# Patient Record
Sex: MALE | Race: BLACK OR AFRICAN AMERICAN | NOT HISPANIC OR LATINO | URBAN - METROPOLITAN AREA
[De-identification: names, ages, dates, MRNs, and addresses within clinical notes are randomized per-mention and may not be internally consistent; named-entity substitution may affect disease eponyms.]

---

## 2024-01-30 ENCOUNTER — HOSPITAL ENCOUNTER (EMERGENCY)
Facility: HOSPITAL | Age: 64
Discharge: LEFT BEFORE DISCHARGE | End: 2024-01-30
Payer: COMMERCIAL

## 2024-01-30 VITALS
BODY MASS INDEX: 39.2 KG/M2 | SYSTOLIC BLOOD PRESSURE: 168 MMHG | OXYGEN SATURATION: 98 % | WEIGHT: 280 LBS | RESPIRATION RATE: 20 BRPM | DIASTOLIC BLOOD PRESSURE: 87 MMHG | HEIGHT: 71 IN | HEART RATE: 115 BPM

## 2024-01-30 PROCEDURE — 4500999001 HC ED NO CHARGE

## 2024-01-30 ASSESSMENT — COLUMBIA-SUICIDE SEVERITY RATING SCALE - C-SSRS
2. HAVE YOU ACTUALLY HAD ANY THOUGHTS OF KILLING YOURSELF?: NO
6. HAVE YOU EVER DONE ANYTHING, STARTED TO DO ANYTHING, OR PREPARED TO DO ANYTHING TO END YOUR LIFE?: NO
1. IN THE PAST MONTH, HAVE YOU WISHED YOU WERE DEAD OR WISHED YOU COULD GO TO SLEEP AND NOT WAKE UP?: NO

## 2024-01-30 ASSESSMENT — PAIN - FUNCTIONAL ASSESSMENT: PAIN_FUNCTIONAL_ASSESSMENT: 0-10

## 2024-01-30 ASSESSMENT — PAIN SCALES - GENERAL: PAINLEVEL_OUTOF10: 0 - NO PAIN

## 2024-05-30 ENCOUNTER — LAB (OUTPATIENT)
Dept: LAB | Facility: LAB | Age: 64
End: 2024-05-30
Payer: COMMERCIAL

## 2024-05-30 ENCOUNTER — HOSPITAL ENCOUNTER (OUTPATIENT)
Dept: RADIOLOGY | Facility: CLINIC | Age: 64
Discharge: HOME | End: 2024-05-30
Payer: COMMERCIAL

## 2024-05-30 ENCOUNTER — OFFICE VISIT (OUTPATIENT)
Dept: PRIMARY CARE | Facility: CLINIC | Age: 64
End: 2024-05-30
Payer: COMMERCIAL

## 2024-05-30 VITALS
HEIGHT: 71 IN | OXYGEN SATURATION: 97 % | RESPIRATION RATE: 16 BRPM | DIASTOLIC BLOOD PRESSURE: 75 MMHG | BODY MASS INDEX: 39.76 KG/M2 | WEIGHT: 284 LBS | HEART RATE: 97 BPM | SYSTOLIC BLOOD PRESSURE: 130 MMHG

## 2024-05-30 DIAGNOSIS — E66.01 CLASS 2 SEVERE OBESITY DUE TO EXCESS CALORIES WITH SERIOUS COMORBIDITY AND BODY MASS INDEX (BMI) OF 39.0 TO 39.9 IN ADULT (MULTI): ICD-10-CM

## 2024-05-30 DIAGNOSIS — M79.674 GREAT TOE PAIN, RIGHT: Primary | ICD-10-CM

## 2024-05-30 DIAGNOSIS — M79.674 GREAT TOE PAIN, RIGHT: ICD-10-CM

## 2024-05-30 DIAGNOSIS — I10 BENIGN ESSENTIAL HTN: Primary | ICD-10-CM

## 2024-05-30 PROBLEM — B35.1 ONYCHOMYCOSIS: Status: ACTIVE | Noted: 2022-08-22

## 2024-05-30 PROBLEM — E66.09 CLASS 2 OBESITY DUE TO EXCESS CALORIES WITHOUT SERIOUS COMORBIDITY IN ADULT: Status: ACTIVE | Noted: 2019-10-15

## 2024-05-30 PROBLEM — G47.33 OSA TREATED WITH BIPAP: Status: ACTIVE | Noted: 2024-05-30

## 2024-05-30 PROBLEM — C61 PROSTATE CANCER (MULTI): Status: ACTIVE | Noted: 2022-03-01

## 2024-05-30 PROBLEM — E66.812 CLASS 2 OBESITY DUE TO EXCESS CALORIES WITHOUT SERIOUS COMORBIDITY IN ADULT: Status: ACTIVE | Noted: 2019-10-15

## 2024-05-30 PROBLEM — N52.9 ED (ERECTILE DYSFUNCTION): Status: ACTIVE | Noted: 2018-11-08

## 2024-05-30 PROCEDURE — 3008F BODY MASS INDEX DOCD: CPT | Performed by: FAMILY MEDICINE

## 2024-05-30 PROCEDURE — 73660 X-RAY EXAM OF TOE(S): CPT | Mod: RIGHT SIDE | Performed by: RADIOLOGY

## 2024-05-30 PROCEDURE — 99204 OFFICE O/P NEW MOD 45 MIN: CPT | Performed by: FAMILY MEDICINE

## 2024-05-30 PROCEDURE — 84550 ASSAY OF BLOOD/URIC ACID: CPT

## 2024-05-30 PROCEDURE — 85652 RBC SED RATE AUTOMATED: CPT

## 2024-05-30 PROCEDURE — 3075F SYST BP GE 130 - 139MM HG: CPT | Performed by: FAMILY MEDICINE

## 2024-05-30 PROCEDURE — 36415 COLL VENOUS BLD VENIPUNCTURE: CPT

## 2024-05-30 PROCEDURE — 3078F DIAST BP <80 MM HG: CPT | Performed by: FAMILY MEDICINE

## 2024-05-30 PROCEDURE — 73660 X-RAY EXAM OF TOE(S): CPT | Mod: RT

## 2024-05-30 PROCEDURE — 86140 C-REACTIVE PROTEIN: CPT

## 2024-05-30 RX ORDER — LISINOPRIL AND HYDROCHLOROTHIAZIDE 20; 25 MG/1; MG/1
1 TABLET ORAL
COMMUNITY
Start: 2023-10-20 | End: 2025-03-19

## 2024-05-30 RX ORDER — ATORVASTATIN CALCIUM 20 MG/1
20 TABLET, FILM COATED ORAL DAILY
COMMUNITY

## 2024-05-30 ASSESSMENT — PATIENT HEALTH QUESTIONNAIRE - PHQ9
SUM OF ALL RESPONSES TO PHQ9 QUESTIONS 1 AND 2: 0
1. LITTLE INTEREST OR PLEASURE IN DOING THINGS: NOT AT ALL
2. FEELING DOWN, DEPRESSED OR HOPELESS: NOT AT ALL

## 2024-05-30 NOTE — PATIENT INSTRUCTIONS
Discussed bunion vs arthritis vs gout (due to current Hydrochlorothiazide use).  X-ray ordered.  Nonfasting labs ordered.  Will consider treatment (gout vs arthritis) vs podiatry referral (bunion) after reviewing results.    Referred to endocrine as discussed.    F/U as needed.    Lab services: [address]  Hours: M-F 6:30a-6p, Sat 8a-12p  Phone: [phone], Option 1

## 2024-05-30 NOTE — PROGRESS NOTES
"Subjective   Patient ID: Sy Burkett is a 63 y.o. male who presents for np to est care and Foot Pain.    HPI   Initial visit.  Here to establish care.  States he recently had annual wellness visit and med refills w/prior PCP.    H/O Bunion (right foot).  Reports right bunion pain x couple wks.  No trauma or increased activity prior to onset.  Described as sharp.  Intermittent.  Daily.  Worse w/wearing shoes, walking, minor trauma.  Improved w/not wearing shoes.  Rated at max 7-8/10.  Rated on average 2/10.  No h/o gout, but currently takes HCTZ for blood pressure.    H/O Obesity.  Wants to consider GLP1s for weight loss.    Review of Systems  No other complaints.     Objective   /75   Pulse 97   Resp 16   Ht 1.803 m (5' 11\")   Wt 129 kg (284 lb)   SpO2 97%   BMI 39.61 kg/m²     Physical Exam  Constitutional:       General: He is not in acute distress.     Appearance: He is obese.   Musculoskeletal:      Comments: Right foot: +Tenderness and swelling at 1st MTP, +Bunion   Neurological:      Mental Status: He is oriented to person, place, and time.   Psychiatric:         Mood and Affect: Mood normal.         Behavior: Behavior normal.     Assessment/Plan   Diagnoses and all orders for this visit:  Great toe pain, right  -     XR toe right 2+ views; Future  -     Uric Acid; Future  -     C-Reactive Protein; Future  -     Sedimentation Rate; Future  Class 2 severe obesity due to excess calories with serious comorbidity and body mass index (BMI) of 39.0 to 39.9 in adult (Multi)  -     Referral to Endocrinology; Future    Discussed bunion vs arthritis vs gout (due to current Hydrochlorothiazide use).  X-ray ordered.  Nonfasting labs ordered.  Will consider treatment (gout vs arthritis) vs podiatry referral (bunion) after reviewing results.    Referred to endocrine as discussed.    F/U as needed.  "

## 2024-05-31 LAB
CRP SERPL-MCNC: 1.02 MG/DL
ERYTHROCYTE [SEDIMENTATION RATE] IN BLOOD BY WESTERGREN METHOD: 32 MM/H (ref 0–20)
URATE SERPL-MCNC: 7.5 MG/DL (ref 4–7.5)

## 2024-06-02 DIAGNOSIS — M79.674 GREAT TOE PAIN, RIGHT: ICD-10-CM

## 2024-06-02 DIAGNOSIS — M21.619 BUNION: Primary | ICD-10-CM

## 2024-06-02 RX ORDER — METHYLPREDNISOLONE 4 MG/1
TABLET ORAL
Qty: 21 TABLET | Refills: 0 | Status: SHIPPED | OUTPATIENT
Start: 2024-06-02 | End: 2024-06-09

## 2024-08-08 ENCOUNTER — HOSPITAL ENCOUNTER (OUTPATIENT)
Dept: RADIOLOGY | Facility: CLINIC | Age: 64
Discharge: HOME | End: 2024-08-08
Payer: COMMERCIAL

## 2024-08-08 DIAGNOSIS — M25.562 LEFT KNEE PAIN, UNSPECIFIED CHRONICITY: ICD-10-CM

## 2024-08-08 PROCEDURE — 73562 X-RAY EXAM OF KNEE 3: CPT | Mod: LT

## 2024-08-08 PROCEDURE — 73562 X-RAY EXAM OF KNEE 3: CPT | Mod: LEFT SIDE | Performed by: RADIOLOGY

## 2024-08-12 ENCOUNTER — OFFICE VISIT (OUTPATIENT)
Dept: ORTHOPEDIC SURGERY | Facility: HOSPITAL | Age: 64
End: 2024-08-12
Payer: COMMERCIAL

## 2024-08-12 VITALS — WEIGHT: 280 LBS | BODY MASS INDEX: 37.93 KG/M2 | HEIGHT: 72 IN

## 2024-08-12 DIAGNOSIS — M17.12 PRIMARY OSTEOARTHRITIS OF LEFT KNEE: Primary | ICD-10-CM

## 2024-08-12 PROCEDURE — 3008F BODY MASS INDEX DOCD: CPT | Performed by: PHYSICIAN ASSISTANT

## 2024-08-12 PROCEDURE — 1125F AMNT PAIN NOTED PAIN PRSNT: CPT | Performed by: PHYSICIAN ASSISTANT

## 2024-08-12 PROCEDURE — 99204 OFFICE O/P NEW MOD 45 MIN: CPT | Performed by: PHYSICIAN ASSISTANT

## 2024-08-12 PROCEDURE — 2500000004 HC RX 250 GENERAL PHARMACY W/ HCPCS (ALT 636 FOR OP/ED): Performed by: PHYSICIAN ASSISTANT

## 2024-08-12 PROCEDURE — 1123F ACP DISCUSS/DSCN MKR DOCD: CPT | Performed by: PHYSICIAN ASSISTANT

## 2024-08-12 PROCEDURE — 1159F MED LIST DOCD IN RCRD: CPT | Performed by: PHYSICIAN ASSISTANT

## 2024-08-12 PROCEDURE — 1036F TOBACCO NON-USER: CPT | Performed by: PHYSICIAN ASSISTANT

## 2024-08-12 PROCEDURE — 20610 DRAIN/INJ JOINT/BURSA W/O US: CPT | Mod: LT | Performed by: PHYSICIAN ASSISTANT

## 2024-08-12 PROCEDURE — 99214 OFFICE O/P EST MOD 30 MIN: CPT | Mod: 25 | Performed by: PHYSICIAN ASSISTANT

## 2024-08-12 PROCEDURE — 2500000005 HC RX 250 GENERAL PHARMACY W/O HCPCS: Performed by: PHYSICIAN ASSISTANT

## 2024-08-12 RX ORDER — MELOXICAM 15 MG/1
TABLET ORAL
COMMUNITY
Start: 2024-08-08

## 2024-08-12 RX ORDER — LIDOCAINE HYDROCHLORIDE 10 MG/ML
0.5 INJECTION INFILTRATION; PERINEURAL
Status: COMPLETED | OUTPATIENT
Start: 2024-08-12 | End: 2024-08-12

## 2024-08-12 ASSESSMENT — PAIN SCALES - GENERAL: PAINLEVEL_OUTOF10: 9

## 2024-08-12 ASSESSMENT — PAIN - FUNCTIONAL ASSESSMENT: PAIN_FUNCTIONAL_ASSESSMENT: 0-10

## 2024-08-12 NOTE — PROGRESS NOTES
NPV-   History of Present Illness  64 y.o.male presents at same day walk in clinic for left knee pain  1. Primary osteoarthritis of left knee  Referral to Physical Therapy      Mechanism of injury: none  Date of Injury/Pain: 2 weeks  Location of pain: anteromedial knee  Frequency of Pain: worse with walking or bending  Associated symptoms? Swelling.  Previous treatment?   [facility], xrays, meloxicam - takes edge off   They deny any locking of the knee  HX of R TKA in 2013    27 point review of systems negative except what is stated in HPI     Constitutional Exam: patient's height and weight reviewed, well-kempt  Psychiatric Exam: alert and oriented x 3, appropriate mood and behavior  Eye Exam: WEI, EOMI  Pulmonary Exam: breathing non-labored, no apparent distress  Lymphatic exam: no appreciable lymphadenopathy in the lower extremities  Cardiovascular exam: DP pulses 2+ bilaterally, PT 2+ bilaterally, toes are pink with good capillary refill, no pitting edema  Skin exam: no open lesions, rashes, abrasions or ulcerations  Neurological exam: sensation to light touch intact in both lower extremities in peripheral and dermatomal distributions (except for any abnormalities noted in musculoskeletal exam)      On examination of the left knee:  Normal gait, neutral alignment  Minimal swelling; No effusion bruising or atrophy.  Neutral alignment.    Normal range of motion in extension and flexion.   Normal strength in flexion and extension.  No extensor lag.    Tenderness to palpation: medial joint line  No tenderness to palpation over the lateral joint line, tibial plateau, femoral condyles, quadriceps tendon, patellar tendon, patella, MCL or LCL.    Neurovascularly intact.  Normal sensation to light touch.  Popliteal, dorsalis pedis and posterior tibial pulses 2+ bilaterally.    Negative Shaun's test.   Negative Apley's test.   Negative anterior drawer test.   Negative posterior drawer test.    Negative Lachman's.   Negative  valgus stress test at 0 and 30° flexion.   Negative varus stress test at 0 and 30° flexion.   1-1 medial/lateral in 30 degrees flexion, 2-1 medial/lateral at  0 degrees with patellar glide test.   Positive patellar grind test.     Patient ID: Sy Burkett is a 64 y.o. male.    L Inj/Asp: L knee on 8/12/2024 9:19 AM  Indications: pain  Details: 22 G needle, anterolateral approach  Medications: 2.5 mg triamcinolone acetonide 10 mg/mL; 0.5 mL lidocaine 10 mg/mL (1 %)  Procedure, treatment alternatives, risks and benefits explained, specific risks discussed. Consent was given by the patient. Immediately prior to procedure a time out was called to verify the correct patient, procedure, equipment, support staff and site/side marked as required. Patient was prepped and draped in the usual sterile fashion.           I personally reviewed the patient's x-ray images and reports of the left knee. The xrays show no fractures or dislocation.  Moderate degenerative changes of the medial and patellofemoral joint.  Chondrocalcinosis       ASSESSMENT: left knee osteoarthritis    PLAN: Treatment options were discussed with the patient. Patient had his knee injected. The patient was given a prescription for physical therapy.  Physical therapy is medically necessary to improve strength, balance, range of motion and functional outcomes after injury and/or surgery. Patient should avoid deep flexion of the knee including kneeling, squatting or sitting in low chairs.  They should also avoid impact activities such as running and jumping but can use a stationary bike, pool exercises and upper body training. Patient was given a handout and instructed on an at home stretching program.  They should do these exercises 3 times per day for 6 weeks and then daily. Patient can use OTC aspercream for pain and continue to ice and elevate supported at the calf to reduce swelling. All the patient's questions were answered. The patient agrees with  the above plan.  Follow up as needed

## 2024-08-12 NOTE — PATIENT INSTRUCTIONS
Patient should avoid deep flexion of the knee including kneeling, squatting or sitting in low chairs.  They should also avoid impact activities such as running and jumping but can use a stationary bike, pool exercises and upper body training.    1. Follow stretching exercises that were on a separate handout   2. Hold each stretch for a least 1 minute  3. Do not bounce while stretching  4. Stretch for 10 minutes at a time, 3x a day for 6 weeks then daily  5. Remember, it takes several weeks to a few months of consistent stretching to increase flexibility and decrease symptoms.     You can use OTC Voltaren gel or aspercream and apply it to the injured area.    Ice and elevate supported at the calf with no pressure on the heel to reduce swelling.    The patient was given a prescription for physical therapy.  Physical therapy is medically necessary to improve strength, balance, range of motion and functional outcomes after injury and/or surgery.    Follow up as needed

## 2024-08-13 ENCOUNTER — APPOINTMENT (OUTPATIENT)
Dept: ORTHOPEDIC SURGERY | Facility: CLINIC | Age: 64
End: 2024-08-13
Payer: COMMERCIAL

## 2024-08-16 ENCOUNTER — OFFICE VISIT (OUTPATIENT)
Dept: ORTHOPEDIC SURGERY | Facility: CLINIC | Age: 64
End: 2024-08-16
Payer: COMMERCIAL

## 2024-08-16 ENCOUNTER — HOSPITAL ENCOUNTER (OUTPATIENT)
Dept: RADIOLOGY | Facility: CLINIC | Age: 64
Discharge: HOME | End: 2024-08-16
Payer: COMMERCIAL

## 2024-08-16 VITALS — WEIGHT: 280 LBS | HEIGHT: 72 IN | BODY MASS INDEX: 37.93 KG/M2

## 2024-08-16 DIAGNOSIS — S46.012A TRAUMATIC TEAR OF LEFT ROTATOR CUFF, UNSPECIFIED TEAR EXTENT, INITIAL ENCOUNTER: Primary | ICD-10-CM

## 2024-08-16 DIAGNOSIS — M25.512 LEFT SHOULDER PAIN, UNSPECIFIED CHRONICITY: ICD-10-CM

## 2024-08-16 PROCEDURE — 1036F TOBACCO NON-USER: CPT | Performed by: STUDENT IN AN ORGANIZED HEALTH CARE EDUCATION/TRAINING PROGRAM

## 2024-08-16 PROCEDURE — 3008F BODY MASS INDEX DOCD: CPT | Performed by: STUDENT IN AN ORGANIZED HEALTH CARE EDUCATION/TRAINING PROGRAM

## 2024-08-16 PROCEDURE — 1123F ACP DISCUSS/DSCN MKR DOCD: CPT | Performed by: STUDENT IN AN ORGANIZED HEALTH CARE EDUCATION/TRAINING PROGRAM

## 2024-08-16 PROCEDURE — 73030 X-RAY EXAM OF SHOULDER: CPT | Mod: LT

## 2024-08-16 PROCEDURE — 99214 OFFICE O/P EST MOD 30 MIN: CPT | Performed by: STUDENT IN AN ORGANIZED HEALTH CARE EDUCATION/TRAINING PROGRAM

## 2024-08-16 PROCEDURE — 1159F MED LIST DOCD IN RCRD: CPT | Performed by: STUDENT IN AN ORGANIZED HEALTH CARE EDUCATION/TRAINING PROGRAM

## 2024-08-16 ASSESSMENT — PAIN - FUNCTIONAL ASSESSMENT: PAIN_FUNCTIONAL_ASSESSMENT: NO/DENIES PAIN

## 2024-08-16 NOTE — PROGRESS NOTES
CHIEF COMPLAINT:   Chief Complaint   Patient presents with    Left Shoulder - Pain       History: 64 y.o. male presents to the office today for Left shoulder pain and popping. He reports of a fall 1 year ago and uses his left arm to break his fall and has had pain since. He is unable to move his shoulder past 90 degrees without being in pain. He has numbness/tingling in both his hands. No Hx of Sx or injection. No reported neck pain. Taken Advil and mobic with no help.  He is right-hand dominant.  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    Past medical history, past surgical history, medications, allergies, family history, social history, and review of systems were reviewed today.    A 12 point review of systems was negative other than as stated in the HPI.    No past medical history on file.     No Known Allergies     Past Surgical History:   Procedure Laterality Date    ADENOIDECTOMY      FOOT SURGERY Right     metatarsal fracture repair w/screw insertion and subsequent removal    TONSILLECTOMY  2022    TOTAL KNEE ARTHROPLASTY Right 2022        Family History   Problem Relation Name Age of Onset    Breast cancer Mother      Cancer Father          unknown primary    Gout Father          Social History     Socioeconomic History    Marital status:      Spouse name: Not on file    Number of children: Not on file    Years of education: Not on file    Highest education level: Not on file   Occupational History    Not on file   Tobacco Use    Smoking status: Former     Current packs/day: 0.00     Average packs/day: 0.5 packs/day for 8.0 years (4.0 ttl pk-yrs)     Types: Cigarettes     Start date:      Quit date:      Years since quittin.6    Smokeless tobacco: Never   Vaping Use    Vaping status: Never Used   Substance and Sexual Activity    Alcohol use: Yes    Drug use: Yes     Types: Marijuana    Sexual activity: Not on file   Other Topics Concern    Not on file   Social  History Narrative    Not on file     Social Determinants of Health     Financial Resource Strain: Patient Declined (3/19/2024)    Received from [facility]    Overall Financial Resource Strain (CARDIA)     Difficulty of Paying Living Expenses: Patient declined   Food Insecurity: Patient Declined (3/19/2024)    Received from [facility]    Hunger Vital Sign     Worried About Running Out of Food in the Last Year: Patient declined     Ran Out of Food in the Last Year: Patient declined   Transportation Needs: Patient Declined (3/19/2024)    Received from [facility]    PRAPARE - Transportation     Lack of Transportation (Medical): Patient declined     Lack of Transportation (Non-Medical): Patient declined   Physical Activity: Inactive (3/19/2024)    Received from [facility]    Exercise Vital Sign     Days of Exercise per Week: 0 days     Minutes of Exercise per Session: 0 min   Stress: No Stress Concern Present (3/19/2024)    Received from [facility] - Occupational Stress Questionnaire     Feeling of Stress : Not at all   Social Connections: Unknown (3/19/2024)    Received from [facility]    Social Connection and Isolation Panel [NHANES]     Frequency of Communication with Friends and Family: Patient declined     Frequency of Social Gatherings with Friends and Family: Patient declined     Attends Anabaptist Services: Patient declined     Active Member of Clubs or Organizations: Patient declined     Attends Club or Organization Meetings: Patient declined     Marital Status:    Intimate Partner Violence: Not on file   Housing Stability: Unknown (3/19/2024)    Received from [facility]    Housing Stability Vital Sign     Unable to Pay for Housing in the Last Year: No     Number of Places Lived in the Last Year:  "Not on file     In the last 12 months, was there a time when you did not have a steady place to sleep or slept in a shelter (including now)?: No        CURRENT MEDICATIONS:   Current Outpatient Medications   Medication Sig Dispense Refill    atorvastatin (Lipitor) 20 mg tablet Take 1 tablet (20 mg) by mouth once daily.      lisinopriL-hydrochlorothiazide 20-25 mg tablet Take 1 tablet by mouth once daily.      meloxicam (Mobic) 15 mg tablet        No current facility-administered medications for this visit.       Physical Examination:      9/19/2022    10:58 AM 9/20/2022     2:18 PM 10/3/2022     7:55 AM 11/9/2022    10:59 AM 1/30/2024     3:00 PM 5/30/2024     2:52 PM 8/12/2024     8:33 AM   Vitals   Systolic 132 123   168 130    Diastolic 84 81   87 75    Heart Rate 75 77   115 97    Resp     20 16    Height (in)  1.829 m (6') 1.828 m (5' 11.97\") 1.829 m (6') 1.803 m (5' 11\") 1.803 m (5' 11\") 1.829 m (6')   Weight (lb) 271 275.31 275.58 283 280 284 280   BMI  37.34 kg/m2 37.41 kg/m2 38.38 kg/m2 39.05 kg/m2 39.61 kg/m2 37.97 kg/m2   BSA (m2)  2.52 m2 2.52 m2 2.55 m2 2.52 m2 2.54 m2 2.54 m2   Visit Report      Report Report      There is no height or weight on file to calculate BMI.    Well-appearing, appears stated age, pleasant and cooperative, appropriate mood and behavior. Height and weight reviewed. Alert and oriented x3.  Auditory function intact.  No acute distress.  Intact ocular function, WEI, EOMI. Breathing is unlabored .  There is no evidence of jugular venous distension. Skin appearance is normal without evidence of rash or other lesions. 2+ radial pulses bilaterally, fingers pink and wwp, good capillary refill, no pitting edema. No appreciable lymphadenopathy in bilateral upper extremities. SILT throughout both upper extremities, median/radial/ulnar/musculocutaneous/axillary nerve motor and sensory intact (except for abnormalities noted in focused musculoskeletal exam section below).     Neck exam: " Full range of motion of the neck in flexion/extension and rotational movements. No significant areas of tenderness to palpation in the neck.    On exam of bilateral upper extremities, very limited range of motion and weakness with left shoulder examination.  On the right he is active forward flexion 160, external Tatian is 60, internal Tatian T12.  On the left he is active forward flexion to 90, external rotation to 30, internal Tatian to T12.  Passively, I can achieve full range of motion with his left shoulder.  He has severe weakness of rotator cuff strength testing on the left side, 3+ out of 5 supraspinatus strength, 4-5 external rotation strength with the mild external rotation lag sign.  Negative abdominal compression test.    Imaging: Radiographs of the left shoulder performed today.  Personally interpreted by myself.  Preserved glenohumeral joint space.  Preserved acromiohumeral interval.  No acute fractures noted.       Assessment: Traumatic left rotator cuff injury    Plan: I had a long discussion with the patient which about treatment options and diagnosis.  The patient had a traumatic injury to his left shoulder 1 year ago.  I do think that he sustained an acute, traumatic left rotator cuff tear at that time based on my examination today.  He has very limited ability to raise the arm and a severe weakness of rotator cuff strength testing.  I do think that it is important for us to get an MRI at this point to evaluate the integrity of the rotator cuff, as this will determine our treatment options.  Questions answered today.  Follow-up after MRI is complete.    Dragon software was used to dictate this note, please be aware that minor errors in transcription may be present.    Erasmo Mcallister MD    Shoulder/Elbow Surgery  [facility]

## 2024-08-29 ENCOUNTER — APPOINTMENT (OUTPATIENT)
Dept: ORTHOPEDIC SURGERY | Facility: HOSPITAL | Age: 64
End: 2024-08-29
Payer: COMMERCIAL

## 2024-08-30 ENCOUNTER — HOSPITAL ENCOUNTER (OUTPATIENT)
Dept: RADIOLOGY | Facility: HOSPITAL | Age: 64
Discharge: HOME | End: 2024-08-30
Payer: COMMERCIAL

## 2024-08-30 DIAGNOSIS — S46.012A TRAUMATIC TEAR OF LEFT ROTATOR CUFF, UNSPECIFIED TEAR EXTENT, INITIAL ENCOUNTER: ICD-10-CM

## 2024-08-30 DIAGNOSIS — M25.512 LEFT SHOULDER PAIN, UNSPECIFIED CHRONICITY: ICD-10-CM

## 2024-08-30 PROCEDURE — 73221 MRI JOINT UPR EXTREM W/O DYE: CPT | Mod: LT

## 2024-09-06 ENCOUNTER — APPOINTMENT (OUTPATIENT)
Dept: ORTHOPEDIC SURGERY | Facility: CLINIC | Age: 64
End: 2024-09-06
Payer: COMMERCIAL

## 2024-09-06 VITALS — BODY MASS INDEX: 37.93 KG/M2 | HEIGHT: 72 IN | WEIGHT: 280 LBS

## 2024-09-06 DIAGNOSIS — S46.012A TRAUMATIC TEAR OF LEFT ROTATOR CUFF, UNSPECIFIED TEAR EXTENT, INITIAL ENCOUNTER: Primary | ICD-10-CM

## 2024-09-06 PROCEDURE — 1036F TOBACCO NON-USER: CPT | Performed by: STUDENT IN AN ORGANIZED HEALTH CARE EDUCATION/TRAINING PROGRAM

## 2024-09-06 PROCEDURE — 1123F ACP DISCUSS/DSCN MKR DOCD: CPT | Performed by: STUDENT IN AN ORGANIZED HEALTH CARE EDUCATION/TRAINING PROGRAM

## 2024-09-06 PROCEDURE — 3008F BODY MASS INDEX DOCD: CPT | Performed by: STUDENT IN AN ORGANIZED HEALTH CARE EDUCATION/TRAINING PROGRAM

## 2024-09-06 PROCEDURE — 99213 OFFICE O/P EST LOW 20 MIN: CPT | Performed by: STUDENT IN AN ORGANIZED HEALTH CARE EDUCATION/TRAINING PROGRAM

## 2024-09-06 PROCEDURE — 1159F MED LIST DOCD IN RCRD: CPT | Performed by: STUDENT IN AN ORGANIZED HEALTH CARE EDUCATION/TRAINING PROGRAM

## 2024-09-06 ASSESSMENT — PAIN - FUNCTIONAL ASSESSMENT: PAIN_FUNCTIONAL_ASSESSMENT: NO/DENIES PAIN

## 2024-09-06 NOTE — PROGRESS NOTES
CHIEF COMPLAINT:   Chief Complaint   Patient presents with    Left Shoulder - Pain, Follow-up       History: 64 y.o. male presents to the office today for a follow up to review his left shoulder MRI results done 2024.  Overall his shoulder is feeling the same.    2024 HPI Visit Info:  Sy Burkett presents to the office today for Left shoulder pain and popping. He reports of a fall 1 year ago and uses his left arm to break his fall and has had pain since. He is unable to move his shoulder past 90 degrees without being in pain. He has numbness/tingling in both his hands. No Hx of Sx or injection. No reported neck pain. Taken Advil and mobic with no help.  He is right-hand dominant.  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    Past medical history, past surgical history, medications, allergies, family history, social history, and review of systems were reviewed today.    A 12 point review of systems was negative other than as stated in the HPI.    No past medical history on file.     No Known Allergies     Past Surgical History:   Procedure Laterality Date    ADENOIDECTOMY      FOOT SURGERY Right     metatarsal fracture repair w/screw insertion and subsequent removal    TONSILLECTOMY  2022    TOTAL KNEE ARTHROPLASTY Right 2022        Family History   Problem Relation Name Age of Onset    Breast cancer Mother      Cancer Father          unknown primary    Gout Father          Social History     Socioeconomic History    Marital status:      Spouse name: Not on file    Number of children: Not on file    Years of education: Not on file    Highest education level: Not on file   Occupational History    Not on file   Tobacco Use    Smoking status: Former     Current packs/day: 0.00     Average packs/day: 0.5 packs/day for 8.0 years (4.0 ttl pk-yrs)     Types: Cigarettes     Start date:      Quit date:      Years since quittin.7    Smokeless tobacco: Never   Vaping Use     Vaping status: Never Used   Substance and Sexual Activity    Alcohol use: Yes    Drug use: Yes     Types: Marijuana    Sexual activity: Not on file   Other Topics Concern    Not on file   Social History Narrative    Not on file     Social Determinants of Health     Financial Resource Strain: Patient Declined (3/19/2024)    Received from [facility]    Overall Financial Resource Strain (CARDIA)     Difficulty of Paying Living Expenses: Patient declined   Food Insecurity: Patient Declined (3/19/2024)    Received from [facility]    Hunger Vital Sign     Worried About Running Out of Food in the Last Year: Patient declined     Ran Out of Food in the Last Year: Patient declined   Transportation Needs: Patient Declined (3/19/2024)    Received from [facility]    PRAPARE - Transportation     Lack of Transportation (Medical): Patient declined     Lack of Transportation (Non-Medical): Patient declined   Physical Activity: Inactive (3/19/2024)    Received from [facility]    Exercise Vital Sign     Days of Exercise per Week: 0 days     Minutes of Exercise per Session: 0 min   Stress: No Stress Concern Present (3/19/2024)    Received from [facility]    [place] of Occupational Health - Occupational Stress Questionnaire     Feeling of Stress : Not at all   Social Connections: Unknown (3/19/2024)    Received from [facility]    Social Connection and Isolation Panel [NHANES]     Frequency of Communication with Friends and Family: Patient declined     Frequency of Social Gatherings with Friends and Family: Patient declined     Attends Latter day Services: Patient declined     Active Member of Clubs or Organizations: Patient declined     Attends Club or Organization Meetings: Patient declined     Marital Status:    Intimate Partner Violence: Not on file   Housing Stability:  "Unknown (3/19/2024)    [facility]    Housing Stability Vital Sign     Unable to Pay for Housing in the Last Year: No     Number of Places Lived in the Last Year: Not on file     Unstable Housing in the Last Year: No        CURRENT MEDICATIONS:   Current Outpatient Medications   Medication Sig Dispense Refill    atorvastatin (Lipitor) 20 mg tablet Take 1 tablet (20 mg) by mouth once daily.      lisinopriL-hydrochlorothiazide 20-25 mg tablet Take 1 tablet by mouth once daily.      meloxicam (Mobic) 15 mg tablet        No current facility-administered medications for this visit.       Physical Examination:      9/20/2022     2:18 PM 10/3/2022     7:55 AM 11/9/2022    10:59 AM 1/30/2024     3:00 PM 5/30/2024     2:52 PM 8/12/2024     8:33 AM 8/16/2024     8:55 AM   Vitals   Systolic 123   168 130     Diastolic 81   87 75     Heart Rate 77   115 97     Resp    20 16     Height (in) 1.829 m (6') 1.828 m (5' 11.97\") 1.829 m (6') 1.803 m (5' 11\") 1.803 m (5' 11\") 1.829 m (6') 1.829 m (6')   Weight (lb) 275.31 275.58 283 280 284 280 280   BMI 37.34 kg/m2 37.41 kg/m2 38.38 kg/m2 39.05 kg/m2 39.61 kg/m2 37.97 kg/m2 37.97 kg/m2   BSA (m2) 2.52 m2 2.52 m2 2.55 m2 2.52 m2 2.54 m2 2.54 m2 2.54 m2   Visit Report     Report Report Report      There is no height or weight on file to calculate BMI.    Well-appearing, appears stated age, pleasant and cooperative, appropriate mood and behavior. Height and weight reviewed. Alert and oriented x3.  Auditory function intact.  No acute distress.  Intact ocular function, WEI, EOMI. Breathing is unlabored .  There is no evidence of jugular venous distension. Skin appearance is normal without evidence of rash or other lesions. 2+ radial pulses bilaterally, fingers pink and wwp, good capillary refill, no pitting edema. No appreciable lymphadenopathy in bilateral upper extremities. SILT throughout both upper extremities, " median/radial/ulnar/musculocutaneous/axillary nerve motor and sensory intact (except for abnormalities noted in focused musculoskeletal exam section below).     Neck exam: Full range of motion of the neck in flexion/extension and rotational movements. No significant areas of tenderness to palpation in the neck.    On exam of bilateral upper extremities, very limited range of motion and weakness with left shoulder examination.  On the right he is active forward flexion 160, external Tatian is 60, internal Tatian T12.  On the left he is active forward flexion to 90, external rotation to 30, internal Tatian to T12.  Passively, I can achieve full range of motion with his left shoulder.  He has severe weakness of rotator cuff strength testing on the left side, 3+ out of 5 supraspinatus strength, 4-5 external rotation strength with the mild external rotation lag sign.  Negative abdominal compression test.    Imaging: MRI of the left shoulder was reviewed today.  Person interpreted by myself.  There is a massive rotator cuff tear involving the entirety of the supraspinatus and infraspinatus.  There is significant muscular atrophy of both rotator cuff muscles as well.    Assessment: Chronic, massive rotator cuff tear    Plan: I had a long discussion with the patient which about treatment options and diagnosis.  Imaging does reveal a chronic, massive rotator cuff tear with significant muscle atrophy.  This is likely related to the injury he had 1 year ago, although truthfully there likely was a component of this tear that was there prior to that.  We did discuss treatment options.  Did tell him that the only reliable way for him to regain his range of motion and strength would be to do a reverse shoulder replacement.  He is not interested in surgery at this time.  Therefore, we discussed physician directed exercises.  Did teach her multiple exercises that he can try on his own to try to see if he can strengthen his shoulder  and regain range of motion.  He declined an injection.  Will see if he can live with this and come back to the office as needed if he would like to discuss a replacement.     Total patient encounter took >20 minutes, including review of the medical record and all previous tests and notes related to this condition, any outside notes, recent or new imaging, as well as the patient interview, documentation in the EMR, placement of orders or any requested physician documentation and coordination of care.       Dragon software was used to dictate this note, please be aware that minor errors in transcription may be present.    Erasmo Mcallister MD    Shoulder/Elbow Surgery  [facility]

## 2024-09-10 ENCOUNTER — TELEMEDICINE CLINICAL SUPPORT (OUTPATIENT)
Dept: ENDOCRINOLOGY | Facility: CLINIC | Age: 64
End: 2024-09-10
Payer: COMMERCIAL

## 2024-09-10 ENCOUNTER — TELEPHONE (OUTPATIENT)
Dept: PRIMARY CARE | Facility: CLINIC | Age: 64
End: 2024-09-10

## 2024-09-10 ENCOUNTER — APPOINTMENT (OUTPATIENT)
Dept: ENDOCRINOLOGY | Facility: CLINIC | Age: 64
End: 2024-09-10
Payer: COMMERCIAL

## 2024-09-10 VITALS
BODY MASS INDEX: 39.55 KG/M2 | SYSTOLIC BLOOD PRESSURE: 112 MMHG | HEIGHT: 72 IN | DIASTOLIC BLOOD PRESSURE: 68 MMHG | WEIGHT: 292 LBS

## 2024-09-10 DIAGNOSIS — E66.01 CLASS 2 SEVERE OBESITY DUE TO EXCESS CALORIES WITH SERIOUS COMORBIDITY AND BODY MASS INDEX (BMI) OF 39.0 TO 39.9 IN ADULT (MULTI): ICD-10-CM

## 2024-09-10 DIAGNOSIS — I10 BENIGN ESSENTIAL HTN: ICD-10-CM

## 2024-09-10 DIAGNOSIS — E78.00 PURE HYPERCHOLESTEROLEMIA: ICD-10-CM

## 2024-09-10 DIAGNOSIS — E88.810 DYSMETABOLIC SYNDROME: ICD-10-CM

## 2024-09-10 DIAGNOSIS — R73.01 IMPAIRED FASTING GLUCOSE: Primary | ICD-10-CM

## 2024-09-10 PROCEDURE — 3078F DIAST BP <80 MM HG: CPT | Performed by: INTERNAL MEDICINE

## 2024-09-10 PROCEDURE — 99204 OFFICE O/P NEW MOD 45 MIN: CPT | Performed by: INTERNAL MEDICINE

## 2024-09-10 PROCEDURE — 3074F SYST BP LT 130 MM HG: CPT | Performed by: INTERNAL MEDICINE

## 2024-09-10 PROCEDURE — 1123F ACP DISCUSS/DSCN MKR DOCD: CPT | Performed by: INTERNAL MEDICINE

## 2024-09-10 PROCEDURE — 1159F MED LIST DOCD IN RCRD: CPT | Performed by: INTERNAL MEDICINE

## 2024-09-10 PROCEDURE — 3008F BODY MASS INDEX DOCD: CPT | Performed by: INTERNAL MEDICINE

## 2024-09-10 RX ORDER — POTASSIUM CHLORIDE 750 MG/1
10 TABLET, FILM COATED, EXTENDED RELEASE ORAL 2 TIMES DAILY
Qty: 60 TABLET | Refills: 5 | Status: SHIPPED | OUTPATIENT
Start: 2024-09-10 | End: 2025-09-10

## 2024-09-10 NOTE — PROGRESS NOTES
Patient ID: Sy Burkett is a 64 y.o. male who presents for New Patient Visit (Endocrine consult. Referred by  for weight loss.).  HPI  The patient was referred for evaluation for PSMF.    This is a 64-year-old gentleman who has had a problem with weight for the past 20 years.    He has been on numerous approaches most success with low-carb but was unable to keep the weight off.    Weight is complicated by insulin resistance with impaired fasting glucose DJD and he needs a left knee replacement hypertension hyperlipidemia edema sleep apnea on CPAP.    He avoid skipping meals does some stress eating.    He is not currently exercising.    He has a past history of bladder stones prostate CA SVT.  He has never had gout kidney stones or gallstones.    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    Family history negative for diabetes thyroid in the sister.  ROS  Comprehensive review of systems is negative.    Objective   Physical Exam  Visit Vitals  /68      Vitals:    09/10/24 0844   Weight: 132 kg (292 lb)      Body mass index is 39.6 kg/m².      Alert and oriented x3  In no distress  No focal neurologic deficits  No supraclavicular, or dorsal fat  No purple striae  Integument intact  Eyes normal  ENT normal. No adenopathy  Thyroid palpable and normal. No nodules  Chest clear to auscultation  Heart sounds are normal  Abdomen nontender. Bowel sounds normal. No organomegaly  Feet are okay  Reflexes normal with normal return    Current Outpatient Medications   Medication Sig Dispense Refill    atorvastatin (Lipitor) 20 mg tablet Take 1 tablet (20 mg) by mouth once daily.      lisinopriL-hydrochlorothiazide 20-25 mg tablet Take 1 tablet by mouth once daily.      meloxicam (Mobic) 15 mg tablet        No current facility-administered medications for this visit.       Assessment/Plan     1.  Insulin resistance  2.   Impaired fasting glucose  3.  Hypertension  4.  Hyperlipidemia  5.  DJD  6.  Sleep apnea    We discussed insulin resistance this pathophysiology and its impact.    We discussed risks, benefits and alternatives to the protein sparing modified fast.    We discussed risks, including, but not limited to the potential for electrolyte imbalance which could lead to cardiac arrhythmia.  The high protein nature of the diet increasing levels of uric acid which could lead to nephrolithiasis or gout.  The low fiber nature of the diet increasing risk for constipation.  Risk for gallstones, cold intolerance, excess skin seen with significant weight loss.      We discussed the benefits of weight loss regarding co-morbid conditions.      We also discussed alternatives to the program, including a balanced calorie restricted approach coupled with exercise and he would like to proceed.  We'll therefore set him up with the nutritionist for dietary instruction. When he starts the program, we'll add in potassium, as well as the other supplements except for the potassium and keep a close eye on electrolytes.    He'll follow up with me in 2 months, sooner as needed.

## 2024-09-10 NOTE — PROGRESS NOTES
"Patient Discussion/Summary:   Met with pt on 09/10/24 [facility]. Expressed desired wt between 220-230lb. Established first goal to reach ~270lb. No regular exercise at this time due to left knee pain that may need surgery. Does well consuming unsweetened drink choice; encouraged increasing water intake.     Protein sparing modified fasting education handout was provided to patient and reviewed at this time along with daily protein and fluid recommendations. Understands need to take supplements, measure foods and test ketones going forward. No concerns or challenge areas presented by pt at this time. Encouraged to follow diet as outlined for optimal results. Discussed meal examples and provided encouragement. Pt engaged and asked multiple questions during session.         No results found for: \"HGBA1C\"   Potassium   Date Value Ref Range Status   10/03/2022 3.8 3.5 - 5.3 mmol/L Final     Magnesium   Date Value Ref Range Status   2022 1.70 1.60 - 2.40 mg/dL Final      Glucose   Date Value Ref Range Status   10/03/2022 90 74 - 99 mg/dL Final     Wt Readings from Last 10 Encounters:   09/10/24 132 kg (292 lb)   24 127 kg (280 lb)   24 127 kg (280 lb)   24 127 kg (280 lb)   24 129 kg (284 lb)   22 128 kg (283 lb)   10/03/22 125 kg (275 lb 9.2 oz)   22 125 kg (275 lb 5 oz)   22 123 kg (271 lb)     Calculations:    IBW: Ideal body weight: 77.6 kg (171 lb 1.2 oz)  Adjusted ideal body weight: 99.5 kg (219 lb 7.1 oz)   Protein need calculated (1.5g/kg IBW) =121xg  Protein intake recommended: 126g = 18oz      Daily Meal Plan:   Morninoz protein          Afternoon:  6.5oz protein + 1 vegetable from the list   Evenin.5oz protein + 1 vegetable from the list   Fluids:        64-96oz per day     Recommended supplements:  Potassium as directed with breakfast and dinner (prescription)  Fiber capsules 1-2 times daily or sugar-free fiber powder (as " tolerated)   Colace (stool softener) as needed, up to 4 capsules/day  Calcium: 500-600 mg, pill form twice daily with breakfast or lunch AND dinner  Multivitamin tablet or capsule per day that is low in iron (i.e. Centrum Silver)  Magnesium: 200-250 mg, pill form with dinner or at bedtime  Bouillon cube one to two times per day as needed, if feeling dizzy   Beginning with the fourth morning of this diet, test urine every morning using Ketostix for ketone (fat) breakdown.

## 2024-09-11 ENCOUNTER — APPOINTMENT (OUTPATIENT)
Dept: ORTHOPEDIC SURGERY | Facility: HOSPITAL | Age: 64
End: 2024-09-11
Payer: COMMERCIAL

## 2024-09-12 DIAGNOSIS — M25.562 LEFT KNEE PAIN, UNSPECIFIED CHRONICITY: Primary | ICD-10-CM

## 2024-09-13 ENCOUNTER — OFFICE VISIT (OUTPATIENT)
Dept: ORTHOPEDIC SURGERY | Facility: HOSPITAL | Age: 64
End: 2024-09-13
Payer: COMMERCIAL

## 2024-09-13 ENCOUNTER — HOSPITAL ENCOUNTER (OUTPATIENT)
Dept: RADIOLOGY | Facility: HOSPITAL | Age: 64
Discharge: HOME | End: 2024-09-13
Payer: COMMERCIAL

## 2024-09-13 DIAGNOSIS — M21.162 VARUS DEFORMITY, NOT ELSEWHERE CLASSIFIED, LEFT KNEE: ICD-10-CM

## 2024-09-13 DIAGNOSIS — M25.362 INSTABILITY OF LEFT KNEE JOINT: ICD-10-CM

## 2024-09-13 DIAGNOSIS — M25.562 LEFT KNEE PAIN, UNSPECIFIED CHRONICITY: ICD-10-CM

## 2024-09-13 DIAGNOSIS — M17.12 PRIMARY OSTEOARTHRITIS OF LEFT KNEE: Primary | ICD-10-CM

## 2024-09-13 PROCEDURE — 99213 OFFICE O/P EST LOW 20 MIN: CPT | Performed by: SPECIALIST/TECHNOLOGIST

## 2024-09-13 PROCEDURE — L1812 KO ELASTIC W/JOINTS PRE OTS: HCPCS | Performed by: STUDENT IN AN ORGANIZED HEALTH CARE EDUCATION/TRAINING PROGRAM

## 2024-09-13 PROCEDURE — 1036F TOBACCO NON-USER: CPT | Performed by: SPECIALIST/TECHNOLOGIST

## 2024-09-13 PROCEDURE — 1159F MED LIST DOCD IN RCRD: CPT | Performed by: SPECIALIST/TECHNOLOGIST

## 2024-09-13 PROCEDURE — 1160F RVW MEDS BY RX/DR IN RCRD: CPT | Performed by: SPECIALIST/TECHNOLOGIST

## 2024-09-13 PROCEDURE — 1123F ACP DISCUSS/DSCN MKR DOCD: CPT | Performed by: SPECIALIST/TECHNOLOGIST

## 2024-09-13 NOTE — PROGRESS NOTES
Chief Complaint: Pain of the Left Knee       HPI:  Sy Burkett is a 64 y.o. male presenting today for new patient evaluation for his left knee.  He was seen previously by my colleague Avila Tolbert PA-C on 8/12/2024 and was provided with an intra-articular corticosteroid injection.  He says that this provided him no relief of his symptoms.  He continues to report a generalized sharp sensation with walking and an achy throbby pain throughout the entire knee worsened at night as well.  He has been using Tylenol and ibuprofen as needed.  Since his appointment with Avila, he has been seen by a provider at the [facility] for an additional opinion on his left knee.  He states that they had begun the process to obtain viscosupplementation injections but was unable to get approval until 3 months from now.  He denies numbness or tingling in the left lower extremity.  He has a prior right knee replacement at the [facility] by a provider who is no longer there.  He states that his left knee feels very similar to his right prior to his surgery.  He states his pain is 7-8 out of 10.  He presents for treatment recommendations.    Objective     ROS:  Constitutional: No fever, no chills, not feeling tired, no recent weight gain and no recent weight loss  ENT: No nosebleeds  Cardiovascular: No chest pain  Respiratory: No shortness of breath and no cough  Gastrointestinal: No abdominal pain, no nausea, no diarrhea, and no vomiting  Musculoskeletal: Positive for left knee pain  Integumentary: No rashes and no skin lesions  Neurological: No headache  Psychiatric: No sleep disturbances no depression  Endocrine: No muscle weakness and no muscle cramps  Hematologic/lymphatic: No swelling glands and no tendency for easy bruising    No past medical history on file.     Past Surgical History:   Procedure Laterality Date    ADENOIDECTOMY      FOOT SURGERY Right     metatarsal fracture repair w/screw insertion and subsequent  removal    TONSILLECTOMY  09/19/2022    TOTAL KNEE ARTHROPLASTY Right 09/19/2022        Social Connections: Unknown (3/19/2024)    [facility]    Social Connection and Isolation Panel [NHANES]     Frequency of Communication with Friends and Family: Patient declined     Frequency of Social Gatherings with Friends and Family: Patient declined     Attends Jain Services: Patient declined     Active Member of Clubs or Organizations: Patient declined     Attends Club or Organization Meetings: Patient declined     Marital Status:           Physical Exam:  General appearance: WN, WD male, in no acute distress  Skin: No rashes, lesions or wounds  Head: Normocephalic, no evidence of trauma  Eye: EOMI, conjunctiva clear, no discharge  ENT: Nares patent  Neck: No abnormal contour, tracheal midline  Chest/lungs: No respiratory distress, speaking in complete sentences  Musculoskeletal: Tender to palpation over the medial joint line.  Stable valgus and varus stress test at 0 and 30 degrees.  He is able to perform an isometric quadriceps contraction and straight leg raise without difficulty or lag.  Knee flexion to approximately 100 degrees.  Patellofemoral crepitus noted on his left side.  Trace effusion.  No decreased ROM, muscle wasting, rigidity    Neurological: A&O x3, no focal deficits, intact bilateral LE  Psych: normal affect, mood, appearance      Image Results:  X-rays dated 8/8/2024 reviewed with the patient in the office today and show no acute fractures or dislocations.  There is moderate to severe patellofemoral osteoarthritic changes, moderate medial compartment and mild to moderate lateral compartment osteoarthritic changes.      Assessment/Plan   Encounter Diagnoses:  Varus deformity, not elsewhere classified, left knee    Instability of left knee joint    Orders Placed This Encounter    KO Medial OA        The patient and I discussed his clinical  presentation and physical exam findings consistent with left knee osteoarthritis.  We discussed his conservative and surgical treatment options.  Unfortunately, the only surgical intervention that would provide him any relief of his symptoms would be a total knee replacement.  He states that he would like to speak with one of the total joint replacement colleagues at their earliest availability.  We have arranged for him to see Dr. Brantley on 9/20/2024.  I did state to the patient that since he had a recent corticosteroid injection into the left knee that this would most likely result in a delayed joint replacement secondary to the risk of increased infection.  Today, we agreed upon continued conservative management.  We agreed upon a medial  brace to be worn at all times while ambulatory.  He is in agreement the plan.  His questions have been answered.    Patient was prescribed a medial  brace for left knee osteoarthritis and varus deformity.The patient is ambulatory with or without aid; but, has weakness, instability and/or deformity of their left knee which requires stabilization from this orthosis to improve their function.      Verbal and written instructions for the use, wear schedule, cleaning and application of this item were given.  Patient was instructed that should the brace result in increased pain, decreased sensation, increased swelling, or an overall worsening of their medical condition, to please contact our office immediately.     Orthotic management and training was provided for skin care, modifications due to healing tissues, edema changes, interruption in skin integrity, and safety precautions with the orthosis.      ** This office note was dictated using Dragon voice to text software and was not proofread for spelling or grammatical errors **

## 2024-09-20 ENCOUNTER — OFFICE VISIT (OUTPATIENT)
Dept: ORTHOPEDIC SURGERY | Facility: CLINIC | Age: 64
End: 2024-09-20
Payer: COMMERCIAL

## 2024-09-20 DIAGNOSIS — M17.12 PRIMARY OSTEOARTHRITIS OF LEFT KNEE: Primary | ICD-10-CM

## 2024-09-20 PROCEDURE — 99214 OFFICE O/P EST MOD 30 MIN: CPT | Performed by: STUDENT IN AN ORGANIZED HEALTH CARE EDUCATION/TRAINING PROGRAM

## 2024-09-20 ASSESSMENT — ENCOUNTER SYMPTOMS
DEPRESSION: 0
OCCASIONAL FEELINGS OF UNSTEADINESS: 0
LOSS OF SENSATION IN FEET: 0

## 2024-09-20 NOTE — PROGRESS NOTES
Sabrina Brantley MD   Adult Reconstruction and Joint Replacement Surgery  Phone: [phone]     Fax: [phone]       Name: Sy Burkett  Age: 64 y.o.   : 1960   Date of Visit: 2024    INITIAL CONSULTATION    CC: Left knee pain    Clinical History:  This patient presents with 1 months of LEFT knee pain. They were referred by Lupillo Sanford PA-C.    Patient has tried the following Ice, NSAIDs, Activity modification, Brace , Corticosteroid injections , and Xray. Date of last steroid injection: 24. Reports it did not help at all. Patient does have pain at night. Patient does not report falls related to this problem. Patient is able to walk indoors only. Patient is currently using nothing as assistive device. Primarily complains of medial pain. Patient has difficulty with climbing stairs, descending stairs, walking , and walking on unlevel surfaces . The pain is significantly impacting their ability to perform activities of daily living. Patient reports no longer able to do activities such as kneeling.     Focused History  PMH: Reviewed and PE/DVT: no  PSH: Reviewed , Hip/Knee replacement: RTKA 2013 [facility], doing well, Hip/Knee surgery: As above, Anesthesia complications: no, Spine surgery: no, Surgical infection: no, and Weight loss surgery: no  Meds: Reviewed, Current Anticoagulants: no, Weight loss medication: no, and Current Opioids: no  Allergies: Reviewed  and The patient reports no contraindications or allergies to cephalosporins, aspirin, NSAIDs or opioids, except as noted above.  FH: No family history of any bleeding or clotting disorders.  SHx: Reviewed, Occupation: , Current smoker: no, EtOH intake weekly: 3 times, Social support: unk, and Preferred physical activities:   Dental Hx: unk  Jehovah´s Witness: no    PROMs/HISTORY  PROMs   No questionnaires on file.     No past medical history on file.    No past medical history on file.  Documented in chart and reviewed.     Past  Surgical History:   Procedure Laterality Date    ADENOIDECTOMY      FOOT SURGERY Right     metatarsal fracture repair w/screw insertion and subsequent removal    TONSILLECTOMY  2022    TOTAL KNEE ARTHROPLASTY Right 2022       Allergies: He has No Known Allergies.     Medications:  Current Outpatient Medications   Medication Instructions    atorvastatin (LIPITOR) 20 mg, oral, Daily    lisinopriL-hydrochlorothiazide 20-25 mg tablet 1 tablet, oral, Daily RT    meloxicam (Mobic) 15 mg tablet     potassium chloride CR 10 mEq ER tablet 10 mEq, oral, 2 times daily, Do not crush, chew, or split.       Family History   Problem Relation Name Age of Onset    Breast cancer Mother      Cancer Father          unknown primary    Gout Father       Documented in chart and reviewed.     Social History     Tobacco Use    Smoking status: Former     Current packs/day: 0.00     Average packs/day: 0.5 packs/day for 8.0 years (4.0 ttl pk-yrs)     Types: Cigarettes     Start date:      Quit date:      Years since quittin.7    Smokeless tobacco: Never   Substance Use Topics    Alcohol use: Yes        Review of Systems: Review of systems completed with medical assistant intake. Please refer to this note.     Physical Exam:  BMI: 39.6.    General: The patient is well appearing and has an appropriate affect.     Neurological Examination: SILT in SPN/DPN/Sural/Saphenous/Tibial nerves. 5/5 EHL, FHL, Tibial anterior, Gastrocnemius. Coordination grossly intact.     Cardiovascular Exam: Capillary refill <2 seconds. No edema. No varicose veins.     Lymphatic Examination: There is no obvious lymphatic swelling present around the involved joint.    Skin Exam: Skin around the pertinent joint is without evidence of infection or rash.    Gait: The patient ambulates with an antalgic gait.     Right Hip Examination:  The skin is intact over the hip.    There is no tenderness over the greater trochanter.    Range of motion is full  extension to 100 degrees of flexion.    The hip is stable without subluxation or dislocation.    The hip internally rotates to 15 degrees and externally rotates to 45 degrees.    There is no pain with hip motion.    Left Hip Examination:  The skin is intact over the hip.    There is no tenderness over the greater trochanter.    Range of motion is full extension to 100 degrees of flexion.    The hip is stable without subluxation or dislocation.    The hip internally rotates to 15 degrees and externally rotates to 45 degrees.    There is no pain with hip motion.    Left Knee Examination:  Examination of the left knee reveals the skin to be intact.    There is a moderate effusion in the knee.    The alignment of the knee is Varus.    This deformity is partially correctable.    There is tenderness to palpation over the joint line.    There is significant quadriceps atrophy.    Range of Motion: 5 to 115 degrees of flexion.    The knee is stable to varus-valgus stress and anterior-posterior stress.     There is moderate grinding with range of motion.    There is mild patellofemoral crepitus.    Right Knee Examination:  Examination of the right knee reveals the skin to be intact.     There is no obvious swelling.    There is a no effusion in the knee.     The alignment of the knee is normal.    There is no tenderness to palpation over the joint line.    There is no significant quadriceps atrophy.    Range of motion is full extension to 120 degrees of flexion.    The knee is stable to varus-valgus stress and anterior-posterior stress.     There is no grinding with range of motion.    There is no patellofemoral crepitus.    Prior Labs:   Prior Labs:   Lab Results   Component Value Date    WBC 6.8 09/14/2022    HGB 11.9 (L) 09/14/2022    HCT 34.8 (L) 09/14/2022    MCV 91 09/14/2022     09/14/2022      Lab Results   Component Value Date    INR 1.1 02/01/2019    PROTIME 12.2 02/01/2019         Lab Results   Component  "Value Date    GLUCOSE 90 10/03/2022    CALCIUM 9.2 10/03/2022     10/03/2022    K 3.8 10/03/2022    CO2 27 10/03/2022     10/03/2022    BUN 25 (H) 10/03/2022    CREATININE 0.97 10/03/2022      No results found for: \"CKTOTAL\", \"CKMB\", \"CKMBINDEX\", \"TROPONINI\"   No results found for: \"HGBA1C\"        Lab Results   Component Value Date    CRP 1.02 (H) 05/30/2024      Lab Results   Component Value Date    SEDRATE 32 (H) 05/30/2024         Radiographs:  Radiographs were personally reviewed today. There is evidence of moderate LEFT knee osteoarthritis without bone on bone apposition.    Impression:  This patient presents with moderate LEFT knee osteoarthritis without bone on bone apposition.  There is no indication for surgery at this time.    Diagnosis:  Primary osteoarthritis of left knee     Recommendations / Plan:    I have discussed the options in detail with the patient. We have discussed anti-inflammatory medication, activity modification, physical therapy, corticosteroid injections, viscosupplementation injections, partial knee replacement surgery and total knee replacement surgery. The patient has not yet exhausted all conservative treatment measures.    The risks and benefits of all these treatment options have been discussed in detail.     Currently their BMI is 39.6.  Discussed that obesity is a risk factor for continued progression of osteoarthritis. Each pound of weight loss offloads their hip and knee joints by 3-6 pounds.  The most effective of these options is weight loss mainly through restricting caloric intake.  He reports already been on a weight loss program and has currently lost 10 pounds which I congratulated him    A physical therapy prescription was ordered for the patient.  Patient will continue their home exercise program. Strategies for pain management using over-the-counter anti-inflammatory medications reviewed -NSAIDs discussed.  Discussed that steroid injections can be repeated " every 3 months but he should wait longer if they last longer.  The patient was previously fit for a brace -medial motor. Encouraged them to maintain range of motion and strength around the knee joints.  They will continue to implement these strategies in addressing their pain.      Recommend the patient continue optimizing nonsurgical treatment interventions as outlined above for management of their knee arthritis.  I would be happy to see them again at any point to discuss surgery if they are more optimized or to review progress with nonsurgical treatment of arthritis. The patient verbalizes understanding with the recommendations and treatment plan as outlined above and is in agreement.  Questions were addressed.    _____________  Sabrina Brantley MD   Attending Orthopaedic Surgeon  [facility]    [facility]     This office note was transcribed with dictation software.  Please excuse any typographical errors, program misunderstandings leading to inadvertent insertions or deletions of inappropriate wording, pronoun errors and other unintentional transcription errors not noticed on proof-reading.

## 2024-10-03 ENCOUNTER — HOSPITAL ENCOUNTER (OUTPATIENT)
Dept: RADIOLOGY | Facility: HOSPITAL | Age: 64
Discharge: HOME | End: 2024-10-03
Payer: COMMERCIAL

## 2024-10-03 ENCOUNTER — OFFICE VISIT (OUTPATIENT)
Dept: ORTHOPEDIC SURGERY | Facility: HOSPITAL | Age: 64
End: 2024-10-03
Payer: COMMERCIAL

## 2024-10-03 DIAGNOSIS — M79.672 LEFT FOOT PAIN: ICD-10-CM

## 2024-10-03 DIAGNOSIS — S99.922A FOOT INJURY, LEFT, INITIAL ENCOUNTER: ICD-10-CM

## 2024-10-03 PROCEDURE — 99204 OFFICE O/P NEW MOD 45 MIN: CPT | Performed by: FAMILY MEDICINE

## 2024-10-03 PROCEDURE — 99214 OFFICE O/P EST MOD 30 MIN: CPT | Performed by: FAMILY MEDICINE

## 2024-10-03 PROCEDURE — E0114 CRUTCH UNDERARM PAIR NO WOOD: HCPCS | Performed by: FAMILY MEDICINE

## 2024-10-03 PROCEDURE — 1159F MED LIST DOCD IN RCRD: CPT | Performed by: FAMILY MEDICINE

## 2024-10-03 PROCEDURE — L3260 AMBULATORY SURGICAL BOOT EAC: HCPCS | Performed by: FAMILY MEDICINE

## 2024-10-03 PROCEDURE — 73630 X-RAY EXAM OF FOOT: CPT | Mod: LT

## 2024-10-03 PROCEDURE — 1123F ACP DISCUSS/DSCN MKR DOCD: CPT | Performed by: FAMILY MEDICINE

## 2024-10-03 NOTE — PROGRESS NOTES
** Please excuse any errors in grammar or translation related to this dictation. Voice recognition software was utilized to prepare this document. **    Assessment & Plan:  64-year-old male with focal base the fifth metatarsal pain.  No injury reported.  Patient is not an endurance athlete making stress injury less likely.  However cannot definitively exclude.  There is a small ossific fragment adjacent to the fifth metatarsal base that appears to be remote avulsion fracture.  No overlying cellulitis.  With that said there is uncertainty for the cause of his current symptoms as pain is out of proportion.    For the next 2 weeks recommend symptomatic management with application of ice pack for 20 minutes every 3-4 hours as needed.  Can utilize OTC analgesics as necessary.  Given his difficulty with weightbearing recommend crutches and use of postop shoe for ambulatory support.  Patient will be made follow-up ointment with Avila Tolbert of the foot and ankle team.  If symptoms do not improve recommend he keep this appointment to have further evaluation completed which may entail repeating imaging versus obtaining advanced imaging.  All questions answered and patient agrees to plan.    Patient was prescribed a set of crutches for the above diagnosed condition. This particular condition impairs the patient's mobility and ability to ambulate safely.  This mobility limitation can be addressed with use of the crutches. Education provided included gait training and safety precautions when using this device.      Chief complaint:  Left foot pain    HPI:  64-year-old male presents to Ortho injury clinic with acute atraumatic left foot pain x 1 day.  Patient is localized to the lateral aspect of his foot.  He notes a chronic callus over this area but no history of fracture or surgery to this site.  Very painful to ambulate.  Patient does not complete high intensity exercises regularly.  No acute increase in activity  levels.  Patient Active Problem List   Diagnosis    Benign essential HTN    Bunion    Class 2 obesity due to excess calories without serious comorbidity in adult    ED (erectile dysfunction)    Onychomycosis    NELDA treated with BiPAP    Prostate cancer (Multi)    Pure hypercholesterolemia    Impaired fasting glucose    Dysmetabolic syndrome     Past Surgical History:   Procedure Laterality Date    ADENOIDECTOMY      FOOT SURGERY Right     metatarsal fracture repair w/screw insertion and subsequent removal    TONSILLECTOMY  09/19/2022    TOTAL KNEE ARTHROPLASTY Right 09/19/2022     Current Outpatient Medications on File Prior to Visit   Medication Sig Dispense Refill    atorvastatin (Lipitor) 20 mg tablet Take 1 tablet (20 mg) by mouth once daily.      lisinopriL-hydrochlorothiazide 20-25 mg tablet Take 1 tablet by mouth once daily.      meloxicam (Mobic) 15 mg tablet       potassium chloride CR 10 mEq ER tablet Take 1 tablet (10 mEq) by mouth 2 times a day. Do not crush, chew, or split. 60 tablet 5     No current facility-administered medications on file prior to visit.       Exam:  LEFT  Foot Exam:  Antalgic gait  No erythema, ecchymosis or warmth.  Soft tissue swelling lateral foot and ankle.  [-] pes planus  TTP base of 5th MT  Non-TTP over talus, calcaneus, navicular, cuneiforms, cuboid,  plantar midfoot, sesamoids  2+ DP, TP pulses  SILT distally  5/5 strength of TP, FDL, FHL, TA, EHL, EDC    General Exam:  Constitutional - NAD, AAO x 3, conversing appropriately.  HEENT- Normocephalic and atraumatic. No facial deformities. Hearing grossly normal.  Lungs - Breathing non-labored with normal rate. No accessory muscle use.  CV - Extremities warm and well-perfused, brisk capillary refill present.   Neuro - CN II-XII grossly intact.      Results:  Xrays of left foot obtained 10/3/2024 personally interpreted avulsed fragment of bone off the fifth metatarsal appears chronic in nature.  Lab Results   Component Value  Date    CREATININE 0.97 10/03/2022

## 2024-10-09 ENCOUNTER — APPOINTMENT (OUTPATIENT)
Dept: ORTHOPEDIC SURGERY | Facility: CLINIC | Age: 64
End: 2024-10-09
Payer: COMMERCIAL

## 2024-10-17 ENCOUNTER — APPOINTMENT (OUTPATIENT)
Dept: ENDOCRINOLOGY | Facility: CLINIC | Age: 64
End: 2024-10-17
Payer: COMMERCIAL

## 2024-10-17 NOTE — PROGRESS NOTES
"Patient Discussion/Summary:   Met with pt on 10/17/24 for MNT discussing the [facility] diet. Expressed desired wt between 220-230lb. No regular exercise at this time due to left knee pain that may need surgery. Pt states stable wt of 265lb x 1 week. Trace-neg ketones reading but testing in the evening. Recommended to test in the AM with first urine and to monitor wt for another 1-2 weeks before we reevaluate. Review dietary intake and pt is following [facility] guidelines. Admits to higher portion of protein and vegetable intake; recommended to following allowances. Is currently not taking calcium, magnesium or fiber regularly but will start.       Typical intake:  Breakfast: 2 eggs +  3 links chicken/turkey sausage  Snack: cheese stick + 1 slice of lunch meat  Lunch: salad (greens, cherry tomatoes, green peppers,mushrooms, olives little ranch dressing), baked fish  Snack: Cheese stick + slice of lunch meat  Dinner: air fried chicken/fish, green salad (greens, tomato, ranch dressing)  Snack: Atkins shake e/o day or SF jello  Drinks: Unsweetened tea, water, diet coke    Protein sparing modified fasting education handout was previously provided to patient and reviewed at this time along with daily protein and fluid recommendations. Understands need to take supplements, measure foods and test ketones going forward. No concerns or challenge areas presented by pt at this time. Encouraged to follow diet as outlined for optimal results. Discussed meal examples and provided encouragement. Pt engaged and asked multiple questions during session.         No results found for: \"HGBA1C\"   Potassium   Date Value Ref Range Status   10/03/2022 3.8 3.5 - 5.3 mmol/L Final     Magnesium   Date Value Ref Range Status   09/14/2022 1.70 1.60 - 2.40 mg/dL Final      Glucose   Date Value Ref Range Status   10/03/2022 90 74 - 99 mg/dL Final     Wt Readings from Last 10 Encounters:   09/10/24 132 kg (292 lb)   09/06/24 127 kg (280 lb)   08/16/24 127 kg " (280 lb)   24 127 kg (280 lb)   24 129 kg (284 lb)   22 128 kg (283 lb)   10/03/22 125 kg (275 lb 9.2 oz)   22 125 kg (275 lb 5 oz)   22 123 kg (271 lb)     Calculations:    IBW: Ideal body weight: 77.6 kg (171 lb 1.2 oz)  Adjusted ideal body weight: 99.5 kg (219 lb 7.1 oz)   Protein need calculated (1.5g/kg IBW) =121xg  Protein intake recommended: 126g = 18oz      Daily Meal Plan:   Morninoz protein          Afternoon:  6.5oz protein + 1 vegetable from the list   Evenin.5oz protein + 1 vegetable from the list   Fluids:        64-96oz per day     Recommended supplements:  Potassium as directed with breakfast and dinner (prescription)  Fiber capsules 1-2 times daily or sugar-free fiber powder (as tolerated)   Colace (stool softener) as needed, up to 4 capsules/day  Calcium: 500-600 mg, pill form twice daily with breakfast or lunch AND dinner  Multivitamin tablet or capsule per day that is low in iron (i.e. Centrum Silver)  Magnesium: 200-250 mg, pill form with dinner or at bedtime  Bouillon cube one to two times per day as needed, if feeling dizzy   Beginning with the fourth morning of this diet, test urine every morning using Ketostix for ketone (fat) breakdown.

## 2024-10-21 ENCOUNTER — APPOINTMENT (OUTPATIENT)
Dept: ORTHOPEDIC SURGERY | Facility: HOSPITAL | Age: 64
End: 2024-10-21
Payer: COMMERCIAL

## 2024-11-25 ENCOUNTER — APPOINTMENT (OUTPATIENT)
Dept: ENDOCRINOLOGY | Facility: CLINIC | Age: 64
End: 2024-11-25
Payer: COMMERCIAL

## 2024-12-02 ENCOUNTER — PATIENT OUTREACH (OUTPATIENT)
Dept: ENDOCRINOLOGY | Facility: CLINIC | Age: 64
End: 2024-12-02
Payer: COMMERCIAL

## 2024-12-02 NOTE — PROGRESS NOTES
Pt with missed NUTR appointment on 11/25 for [facility] program. Spoke with pt regarding rescheduling and he has denied need at this time. Pt will be graduated for active caseload until further appointments are requested.

## 2025-01-09 ENCOUNTER — APPOINTMENT (OUTPATIENT)
Dept: ENDOCRINOLOGY | Facility: CLINIC | Age: 65
End: 2025-01-09
Payer: COMMERCIAL